# Patient Record
Sex: FEMALE | Race: BLACK OR AFRICAN AMERICAN | NOT HISPANIC OR LATINO
[De-identification: names, ages, dates, MRNs, and addresses within clinical notes are randomized per-mention and may not be internally consistent; named-entity substitution may affect disease eponyms.]

---

## 2018-06-27 ENCOUNTER — RESULT REVIEW (OUTPATIENT)
Age: 43
End: 2018-06-27

## 2021-06-16 ENCOUNTER — RESULT REVIEW (OUTPATIENT)
Age: 46
End: 2021-06-16

## 2021-10-28 ENCOUNTER — OUTPATIENT (OUTPATIENT)
Dept: OUTPATIENT SERVICES | Facility: HOSPITAL | Age: 46
LOS: 1 days | End: 2021-10-28
Payer: COMMERCIAL

## 2021-10-28 VITALS
HEART RATE: 95 BPM | DIASTOLIC BLOOD PRESSURE: 90 MMHG | WEIGHT: 158.07 LBS | SYSTOLIC BLOOD PRESSURE: 150 MMHG | RESPIRATION RATE: 16 BRPM | TEMPERATURE: 98 F | HEIGHT: 64 IN | OXYGEN SATURATION: 99 %

## 2021-10-28 DIAGNOSIS — N93.9 ABNORMAL UTERINE AND VAGINAL BLEEDING, UNSPECIFIED: ICD-10-CM

## 2021-10-28 DIAGNOSIS — Z01.818 ENCOUNTER FOR OTHER PREPROCEDURAL EXAMINATION: ICD-10-CM

## 2021-10-28 DIAGNOSIS — Z29.9 ENCOUNTER FOR PROPHYLACTIC MEASURES, UNSPECIFIED: ICD-10-CM

## 2021-10-28 DIAGNOSIS — I10 ESSENTIAL (PRIMARY) HYPERTENSION: ICD-10-CM

## 2021-10-28 LAB
A1C WITH ESTIMATED AVERAGE GLUCOSE RESULT: 5.7 % — HIGH (ref 4–5.6)
ALLERGY+IMMUNOLOGY DIAG STUDY NOTE: SIGNIFICANT CHANGE UP
ANION GAP SERPL CALC-SCNC: 12 MMOL/L — SIGNIFICANT CHANGE UP (ref 5–17)
ANTIBODY INTERPRETATION 2: SIGNIFICANT CHANGE UP
APPEARANCE UR: CLEAR — SIGNIFICANT CHANGE UP
APTT BLD: 31.8 SEC — SIGNIFICANT CHANGE UP (ref 27.5–35.5)
BACTERIA # UR AUTO: ABNORMAL
BASOPHILS # BLD AUTO: 0.02 K/UL — SIGNIFICANT CHANGE UP (ref 0–0.2)
BASOPHILS NFR BLD AUTO: 0.5 % — SIGNIFICANT CHANGE UP (ref 0–2)
BILIRUB UR-MCNC: NEGATIVE — SIGNIFICANT CHANGE UP
BLD GP AB SCN SERPL QL: SIGNIFICANT CHANGE UP
BUN SERPL-MCNC: 7.5 MG/DL — LOW (ref 8–20)
CALCIUM SERPL-MCNC: 9.5 MG/DL — SIGNIFICANT CHANGE UP (ref 8.6–10.2)
CHLORIDE SERPL-SCNC: 101 MMOL/L — SIGNIFICANT CHANGE UP (ref 98–107)
CO2 SERPL-SCNC: 25 MMOL/L — SIGNIFICANT CHANGE UP (ref 22–29)
COD CRY URNS QL: ABNORMAL
COLOR SPEC: YELLOW — SIGNIFICANT CHANGE UP
CREAT SERPL-MCNC: 0.51 MG/DL — SIGNIFICANT CHANGE UP (ref 0.5–1.3)
DAT IGG-SP REAG RBC-IMP: ABNORMAL
DIFF PNL FLD: ABNORMAL
DIR ANTIGLOB POLYSPECIFIC INTERPRETATION: ABNORMAL
EOSINOPHIL # BLD AUTO: 0.03 K/UL — SIGNIFICANT CHANGE UP (ref 0–0.5)
EOSINOPHIL NFR BLD AUTO: 0.7 % — SIGNIFICANT CHANGE UP (ref 0–6)
EPI CELLS # UR: SIGNIFICANT CHANGE UP
ESTIMATED AVERAGE GLUCOSE: 117 MG/DL — HIGH (ref 68–114)
GLUCOSE SERPL-MCNC: 122 MG/DL — HIGH (ref 70–99)
GLUCOSE UR QL: NEGATIVE MG/DL — SIGNIFICANT CHANGE UP
HCG UR QL: NEGATIVE — SIGNIFICANT CHANGE UP
HCT VFR BLD CALC: 43.1 % — SIGNIFICANT CHANGE UP (ref 34.5–45)
HGB BLD-MCNC: 13.5 G/DL — SIGNIFICANT CHANGE UP (ref 11.5–15.5)
IAT COMP-SP REAG SERPL QL: SIGNIFICANT CHANGE UP
IMM GRANULOCYTES NFR BLD AUTO: 0.2 % — SIGNIFICANT CHANGE UP (ref 0–1.5)
INR BLD: 1.06 RATIO — SIGNIFICANT CHANGE UP (ref 0.88–1.16)
KETONES UR-MCNC: ABNORMAL
LEUKOCYTE ESTERASE UR-ACNC: NEGATIVE — SIGNIFICANT CHANGE UP
LYMPHOCYTES # BLD AUTO: 1.03 K/UL — SIGNIFICANT CHANGE UP (ref 1–3.3)
LYMPHOCYTES # BLD AUTO: 23.7 % — SIGNIFICANT CHANGE UP (ref 13–44)
MCHC RBC-ENTMCNC: 24.2 PG — LOW (ref 27–34)
MCHC RBC-ENTMCNC: 31.3 GM/DL — LOW (ref 32–36)
MCV RBC AUTO: 77.2 FL — LOW (ref 80–100)
MONOCYTES # BLD AUTO: 0.42 K/UL — SIGNIFICANT CHANGE UP (ref 0–0.9)
MONOCYTES NFR BLD AUTO: 9.7 % — SIGNIFICANT CHANGE UP (ref 2–14)
NEUTROPHILS # BLD AUTO: 2.83 K/UL — SIGNIFICANT CHANGE UP (ref 1.8–7.4)
NEUTROPHILS NFR BLD AUTO: 65.2 % — SIGNIFICANT CHANGE UP (ref 43–77)
NITRITE UR-MCNC: NEGATIVE — SIGNIFICANT CHANGE UP
PH UR: 6 — SIGNIFICANT CHANGE UP (ref 5–8)
PLATELET # BLD AUTO: 337 K/UL — SIGNIFICANT CHANGE UP (ref 150–400)
POTASSIUM SERPL-MCNC: 3.4 MMOL/L — LOW (ref 3.5–5.3)
POTASSIUM SERPL-SCNC: 3.4 MMOL/L — LOW (ref 3.5–5.3)
PROT UR-MCNC: 30 MG/DL
PROTHROM AB SERPL-ACNC: 12.3 SEC — SIGNIFICANT CHANGE UP (ref 10.6–13.6)
RBC # BLD: 5.58 M/UL — HIGH (ref 3.8–5.2)
RBC # FLD: 15.1 % — HIGH (ref 10.3–14.5)
RBC CASTS # UR COMP ASSIST: SIGNIFICANT CHANGE UP /HPF (ref 0–4)
SODIUM SERPL-SCNC: 138 MMOL/L — SIGNIFICANT CHANGE UP (ref 135–145)
SP GR SPEC: 1.02 — SIGNIFICANT CHANGE UP (ref 1.01–1.02)
T3 SERPL-MCNC: 140 NG/DL — SIGNIFICANT CHANGE UP (ref 80–200)
T4 AB SER-ACNC: 9 UG/DL — SIGNIFICANT CHANGE UP (ref 4.5–12)
TSH SERPL-MCNC: 0.34 UIU/ML — SIGNIFICANT CHANGE UP (ref 0.27–4.2)
UROBILINOGEN FLD QL: NEGATIVE MG/DL — SIGNIFICANT CHANGE UP
WBC # BLD: 4.34 K/UL — SIGNIFICANT CHANGE UP (ref 3.8–10.5)
WBC # FLD AUTO: 4.34 K/UL — SIGNIFICANT CHANGE UP (ref 3.8–10.5)
WBC UR QL: SIGNIFICANT CHANGE UP

## 2021-10-28 PROCEDURE — 93005 ELECTROCARDIOGRAM TRACING: CPT

## 2021-10-28 PROCEDURE — G0463: CPT

## 2021-10-28 PROCEDURE — 93010 ELECTROCARDIOGRAM REPORT: CPT

## 2021-10-28 PROCEDURE — 86077 PHYS BLOOD BANK SERV XMATCH: CPT

## 2021-10-28 RX ORDER — METRONIDAZOLE 500 MG
500 TABLET ORAL ONCE
Refills: 0 | Status: DISCONTINUED | OUTPATIENT
Start: 2021-11-09 | End: 2021-11-10

## 2021-10-28 NOTE — H&P PST ADULT - ASSESSMENT
CAPRINI SCORE    AGE RELATED RISK FACTORS                                                             [x ] Age 41-60 years                                            (1 Point)  [ ] Age: 61-74 years                                           (2 Points)                 [ ] Age= 75 years                                                (3 Points)             DISEASE RELATED RISK FACTORS                                                       [ ] Edema in the lower extremities                 (1 Point)                     [ ] Varicose veins                                               (1 Point)                                 [x ] BMI > 25 Kg/m2                                            (1 Point)                                  [ ] Serious infection (ie PNA)                            (1 Point)                     [ ] Lung disease ( COPD, Emphysema)            (1 Point)                                                                          [ ] Acute myocardial infarction                         (1 Point)                  [ ] Congestive heart failure (in the previous month)  (1 Point)         [ ] Inflammatory bowel disease                            (1 Point)                  [ ] Central venous access, PICC or Port               (2 points)       (within the last month)                                                                [ ] Stroke (in the previous month)                        (5 Points)    [ ] Previous or present malignancy                       (2 points)                                                                                                                                                         HEMATOLOGY RELATED FACTORS                                                         [ ] Prior episodes of VTE                                     (3 Points)                     [ ] Positive family history for VTE                      (3 Points)                  [ ] Prothrombin 09468 A                                     (3 Points)                     [ ] Factor V Leiden                                                (3 Points)                        [ ] Lupus anticoagulants                                      (3 Points)                                                           [ ] Anticardiolipin antibodies                              (3 Points)                                                       [ ] High homocysteine in the blood                   (3 Points)                                             [ ] Other congenital or acquired thrombophilia      (3 Points)                                                [ ] Heparin induced thrombocytopenia                  (3 Points)                                        MOBILITY RELATED FACTORS  [ ] Bed rest                                                         (1 Point)  [ ] Plaster cast                                                    (2 points)  [ ] Bed bound for more than 72 hours           (2 Points)    GENDER SPECIFIC FACTORS  [ ] Pregnancy or had a baby within the last month   (1 Point)  [ ] Post-partum < 6 weeks                                   (1 Point)  [ ] Hormonal therapy  or oral contraception   (1 Point)  [x ] History of pregnancy complications              (1 point)  [ ] Unexplained or recurrent              (1 Point)    OTHER RISK FACTORS                                           (1 Point)  [ ] BMI >40, smoking, diabetes requiring insulin, chemotherapy  blood transfusions and length of surgery over 2 hours    SURGERY RELATED RISK FACTORS  [ ]  Section within the last month     (1 Point)  [ ] Minor surgery                                                  (1 Point)  [ ] Arthroscopic surgery                                       (2 Points)  [x ] Planned major surgery lasting more            (2 Points)      than 45 minutes     [ ] Elective hip or knee joint replacement       (5 points)       surgery                                                TRAUMA RELATED RISK FACTORS  [ ] Fracture of the hip, pelvis, or leg                       (5 Points)  [ ] Spinal cord injury resulting in paralysis             (5 points)       (in the previous month)    [ ] Paralysis  (less than 1 month)                             (5 Points)  [ ] Multiple Trauma within 1 month                        (5 Points)    Total Score [   5     ]    Caprini Score 0-2: Low Risk, NO VTE prophylaxis required for most patients, encourage ambulation  Caprini Score 3-6: Moderate Risk , pharmacologic VTE prophylaxis is indicated for most patients (in the absence of contraindications)  Caprini Score Greater than or =7: High risk, pharmocologic VTE prophylaxis indicated for most patients (in the absence of contraindications)              OPIOID RISK TOOL    ML EACH BOX THAT APPLIES AND ADD TOTALS AT THE END    FAMILY HISTORY OF SUBSTANCE ABUSE                 FEMALE         MALE                                                Alcohol                             [  ]1 pt          [  ]3pts                                               Illegal Durgs                     [  ]2 pts        [  ]3pts                                               Rx Drugs                           [  ]4 pts        [  ]4 pts    PERSONAL HISTORY OF SUBSTANCE ABUSE                                                                                          Alcohol                             [  ]3 pts       [  ]3 pts                                               Illegal Durgs                     [  ]4 pts        [  ]4 pts                                               Rx Drugs                           [  ]5 pts        [  ]5 pts    AGE BETWEEN 16-45 YEARS                                      [  ]1 pt         [  ]1 pt    HISTORY OF PREADOLESCENT   SEXUAL ABUSE                                                             [  ]3 pts        [  ]0pts    PSYCHOLOGICAL DISEASE                     ADD, OCD, Bipolar, Schizophrenia        [  ]2 pts         [  ]2 pts                      Depression                                               [  ]1 pt           [  ]1 pt           SCORING TOTAL   0                                 A score of 3 or lower indicated LOW risk for future opiod abuse  A score of 4 to 7 indicated moderate risk for future opiod abuse  A score of 8 or higher indicates a high risk for opiod abuse    46 year old female , HTN, anemia, fibroids, presents to PST today with menorrhagia s/p lupron injection 3 months ago LMP 2021. Patient reports night sweats and nausea that she attributes to lupron injections. She denies any abdominal pain, constipation, vomiting, diarhea or spotting. Patient is scheduled for laparoscopic total hysterectomy and bilateral salpingectomy, 2 cystoscopy with Dr Leal on 21. Patient educated on surgical scrub, COVID testing , preadmission instructions, medical clearance and day of procedure medications, verbalizes understanding. Pt instructed to stop vitamins/supplements/herbal medications/ASA/NSAIDS for one week prior to surgery and discuss with PMD.  CAPRINI SCORE    AGE RELATED RISK FACTORS                                                             [x ] Age 41-60 years                                            (1 Point)  [ ] Age: 61-74 years                                           (2 Points)                 [ ] Age= 75 years                                                (3 Points)             DISEASE RELATED RISK FACTORS                                                       [ ] Edema in the lower extremities                 (1 Point)                     [ ] Varicose veins                                               (1 Point)                                 [x ] BMI > 25 Kg/m2                                            (1 Point)                                  [ ] Serious infection (ie PNA)                            (1 Point)                     [ ] Lung disease ( COPD, Emphysema)            (1 Point)                                                                          [ ] Acute myocardial infarction                         (1 Point)                  [ ] Congestive heart failure (in the previous month)  (1 Point)         [ ] Inflammatory bowel disease                            (1 Point)                  [ ] Central venous access, PICC or Port               (2 points)       (within the last month)                                                                [ ] Stroke (in the previous month)                        (5 Points)    [ ] Previous or present malignancy                       (2 points)                                                                                                                                                         HEMATOLOGY RELATED FACTORS                                                         [ ] Prior episodes of VTE                                     (3 Points)                     [ ] Positive family history for VTE                      (3 Points)                  [ ] Prothrombin 84688 A                                     (3 Points)                     [ ] Factor V Leiden                                                (3 Points)                        [ ] Lupus anticoagulants                                      (3 Points)                                                           [ ] Anticardiolipin antibodies                              (3 Points)                                                       [ ] High homocysteine in the blood                   (3 Points)                                             [ ] Other congenital or acquired thrombophilia      (3 Points)                                                [ ] Heparin induced thrombocytopenia                  (3 Points)                                        MOBILITY RELATED FACTORS  [ ] Bed rest                                                         (1 Point)  [ ] Plaster cast                                                    (2 points)  [ ] Bed bound for more than 72 hours           (2 Points)    GENDER SPECIFIC FACTORS  [ ] Pregnancy or had a baby within the last month   (1 Point)  [ ] Post-partum < 6 weeks                                   (1 Point)  [ ] Hormonal therapy  or oral contraception   (1 Point)  [x ] History of pregnancy complications              (1 point)  [ ] Unexplained or recurrent              (1 Point)    OTHER RISK FACTORS                                           (1 Point)  [ ] BMI >40, smoking, diabetes requiring insulin, chemotherapy  blood transfusions and length of surgery over 2 hours    SURGERY RELATED RISK FACTORS  [ ]  Section within the last month     (1 Point)  [ ] Minor surgery                                                  (1 Point)  [ ] Arthroscopic surgery                                       (2 Points)  [x ] Planned major surgery lasting more            (2 Points)      than 45 minutes     [ ] Elective hip or knee joint replacement       (5 points)       surgery                                                TRAUMA RELATED RISK FACTORS  [ ] Fracture of the hip, pelvis, or leg                       (5 Points)  [ ] Spinal cord injury resulting in paralysis             (5 points)       (in the previous month)    [ ] Paralysis  (less than 1 month)                             (5 Points)  [ ] Multiple Trauma within 1 month                        (5 Points)    Total Score [   5     ]    Caprini Score 0-2: Low Risk, NO VTE prophylaxis required for most patients, encourage ambulation  Caprini Score 3-6: Moderate Risk , pharmacologic VTE prophylaxis is indicated for most patients (in the absence of contraindications)  Caprini Score Greater than or =7: High risk, pharmocologic VTE prophylaxis indicated for most patients (in the absence of contraindications)              OPIOID RISK TOOL    ML EACH BOX THAT APPLIES AND ADD TOTALS AT THE END    FAMILY HISTORY OF SUBSTANCE ABUSE                 FEMALE         MALE                                                Alcohol                             [  ]1 pt          [  ]3pts                                               Illegal Durgs                     [  ]2 pts        [  ]3pts                                               Rx Drugs                           [  ]4 pts        [  ]4 pts    PERSONAL HISTORY OF SUBSTANCE ABUSE                                                                                          Alcohol                             [  ]3 pts       [  ]3 pts                                               Illegal Durgs                     [  ]4 pts        [  ]4 pts                                               Rx Drugs                           [  ]5 pts        [  ]5 pts    AGE BETWEEN 16-45 YEARS                                      [  ]1 pt         [  ]1 pt    HISTORY OF PREADOLESCENT   SEXUAL ABUSE                                                             [  ]3 pts        [  ]0pts    PSYCHOLOGICAL DISEASE                     ADD, OCD, Bipolar, Schizophrenia        [  ]2 pts         [  ]2 pts                      Depression                                               [  ]1 pt           [  ]1 pt           SCORING TOTAL   0                                 A score of 3 or lower indicated LOW risk for future opiod abuse  A score of 4 to 7 indicated moderate risk for future opiod abuse  A score of 8 or higher indicates a high risk for opiod abuse    46 year old female , HTN, anemia, fibroids, enlarged thyroid on US has never taken medication, presents to PST today with menorrhagia s/p lupron injection 3 months ago LMP 2021. Patient reports night sweats and nausea that she attributes to lupron injections. She denies any abdominal pain, constipation, vomiting, diarhea or spotting. Patient is scheduled for laparoscopic total hysterectomy and bilateral salpingectomy, 2 cystoscopy with Dr Leal on 21. Patient educated on surgical scrub, COVID testing , preadmission instructions, medical clearance and day of procedure medications, verbalizes understanding. Pt instructed to stop vitamins/supplements/herbal medications/ASA/NSAIDS for one week prior to surgery and discuss with PMD. Abdomen soft non tender, fibroid palpable LLQ, Thyroid is enlarged on exam, thyroid panel pending, BP recheck pending, medical clearance pending

## 2021-10-28 NOTE — H&P PST ADULT - PROBLEM SELECTOR PLAN 1
Patient is scheduled for laparoscopic total hysterectomy bilateral salpingectomy, cystoscopy on 11/9/21. Medical clearance pending

## 2021-10-28 NOTE — H&P PST ADULT - NSICDXPASTMEDICALHX_GEN_ALL_CORE_FT
PAST MEDICAL HISTORY:  Abnormal uterine bleeding     Anemia     Hypertension     Uterine leiomyoma

## 2021-10-28 NOTE — H&P PST ADULT - HISTORY OF PRESENT ILLNESS
46 year old female , HTN, anemia, fibroids, presents to PST today with menorrhagia s/p lupron injection 3 months ago LMP 2021. Patient reports night sweats and nausea that she attributes to lupron injections. She denies any abdominal pain, constipation, vomiting, diarhea or spotting. Patient is scheduled for laparoscopic total hysterectomy and bilateral salpingectomy, 2 cystoscopy with Dr Leal on 21.  Medical clearance pending  46 year old female , HTN, anemia, fibroids, history of enlarged thyroid but reports she has never taken medication. She presents to PST today with menorrhagia s/p lupron injection 3 months ago LMP 2021. Patient reports night sweats and nausea that she attributes to lupron injections. She denies any abdominal pain, constipation, vomiting, diarhea or spotting. Patient is scheduled for laparoscopic total hysterectomy and bilateral salpingectomy, 2 cystoscopy with Dr Leal on 21.  Medical clearance pending

## 2021-10-28 NOTE — H&P PST ADULT - NSANTHOSAYNRD_GEN_A_CORE
No. LEXX screening performed.  STOP BANG Legend: 0-2 = LOW Risk; 3-4 = INTERMEDIATE Risk; 5-8 = HIGH Risk

## 2021-10-28 NOTE — H&P PST ADULT - GASTROINTESTINAL DETAILS
palpable fibroid left lower quadrant/soft/nontender/no distention/bowel sounds normal/no bruit/no guarding/no rigidity

## 2021-10-28 NOTE — H&P PST ADULT - PROBLEM SELECTOR PLAN 3
BP BP elevated today at /90 pt reports she is nervous regarding procedure, takes amlodipine 10mg at night, continue as ordered, BP recheck at medical evaluation. Medical clearance pending

## 2021-10-28 NOTE — H&P PST ADULT - NEGATIVE GASTROINTESTINAL SYMPTOMS
no vomiting/no diarrhea/no constipation/no change in bowel habits/no flatulence/no abdominal pain/no melena

## 2021-10-29 LAB — ALLERGY+IMMUNOLOGY DIAG STUDY NOTE: SIGNIFICANT CHANGE UP

## 2021-11-03 DIAGNOSIS — Z01.818 ENCOUNTER FOR OTHER PREPROCEDURAL EXAMINATION: ICD-10-CM

## 2021-11-06 ENCOUNTER — APPOINTMENT (OUTPATIENT)
Dept: DISASTER EMERGENCY | Facility: CLINIC | Age: 46
End: 2021-11-06

## 2021-11-07 LAB — SARS-COV-2 N GENE NPH QL NAA+PROBE: NOT DETECTED

## 2021-11-08 ENCOUNTER — TRANSCRIPTION ENCOUNTER (OUTPATIENT)
Age: 46
End: 2021-11-08

## 2021-11-09 ENCOUNTER — INPATIENT (INPATIENT)
Facility: HOSPITAL | Age: 46
LOS: 0 days | Discharge: HOME | DRG: 743 | End: 2021-11-10
Attending: OBSTETRICS & GYNECOLOGY | Admitting: OBSTETRICS & GYNECOLOGY
Payer: COMMERCIAL

## 2021-11-09 ENCOUNTER — RESULT REVIEW (OUTPATIENT)
Age: 46
End: 2021-11-09

## 2021-11-09 VITALS
OXYGEN SATURATION: 100 % | HEIGHT: 64 IN | TEMPERATURE: 98 F | SYSTOLIC BLOOD PRESSURE: 169 MMHG | DIASTOLIC BLOOD PRESSURE: 103 MMHG | HEART RATE: 94 BPM | WEIGHT: 158.07 LBS | RESPIRATION RATE: 16 BRPM

## 2021-11-09 DIAGNOSIS — N93.9 ABNORMAL UTERINE AND VAGINAL BLEEDING, UNSPECIFIED: ICD-10-CM

## 2021-11-09 DIAGNOSIS — D25.9 LEIOMYOMA OF UTERUS, UNSPECIFIED: ICD-10-CM

## 2021-11-09 LAB
ANION GAP SERPL CALC-SCNC: 16 MMOL/L — SIGNIFICANT CHANGE UP (ref 5–17)
BLD GP AB SCN SERPL QL: SIGNIFICANT CHANGE UP
BUN SERPL-MCNC: 6.7 MG/DL — LOW (ref 8–20)
CALCIUM SERPL-MCNC: 8.8 MG/DL — SIGNIFICANT CHANGE UP (ref 8.6–10.2)
CHLORIDE SERPL-SCNC: 97 MMOL/L — LOW (ref 98–107)
CO2 SERPL-SCNC: 24 MMOL/L — SIGNIFICANT CHANGE UP (ref 22–29)
CREAT SERPL-MCNC: 0.45 MG/DL — LOW (ref 0.5–1.3)
GLUCOSE BLDC GLUCOMTR-MCNC: 111 MG/DL — HIGH (ref 70–99)
GLUCOSE BLDC GLUCOMTR-MCNC: 114 MG/DL — HIGH (ref 70–99)
GLUCOSE BLDC GLUCOMTR-MCNC: 168 MG/DL — HIGH (ref 70–99)
GLUCOSE SERPL-MCNC: 161 MG/DL — HIGH (ref 70–99)
POTASSIUM SERPL-MCNC: 3.6 MMOL/L — SIGNIFICANT CHANGE UP (ref 3.5–5.3)
POTASSIUM SERPL-SCNC: 3.6 MMOL/L — SIGNIFICANT CHANGE UP (ref 3.5–5.3)
SODIUM SERPL-SCNC: 137 MMOL/L — SIGNIFICANT CHANGE UP (ref 135–145)

## 2021-11-09 PROCEDURE — 88307 TISSUE EXAM BY PATHOLOGIST: CPT | Mod: 26

## 2021-11-09 PROCEDURE — 88302 TISSUE EXAM BY PATHOLOGIST: CPT | Mod: 26

## 2021-11-09 RX ORDER — FENTANYL CITRATE 50 UG/ML
50 INJECTION INTRAVENOUS
Refills: 0 | Status: DISCONTINUED | OUTPATIENT
Start: 2021-11-09 | End: 2021-11-09

## 2021-11-09 RX ORDER — ENOXAPARIN SODIUM 100 MG/ML
40 INJECTION SUBCUTANEOUS EVERY 24 HOURS
Refills: 0 | Status: DISCONTINUED | OUTPATIENT
Start: 2021-11-09 | End: 2021-11-10

## 2021-11-09 RX ORDER — OXYCODONE HYDROCHLORIDE 5 MG/1
10 TABLET ORAL
Refills: 0 | Status: DISCONTINUED | OUTPATIENT
Start: 2021-11-09 | End: 2021-11-10

## 2021-11-09 RX ORDER — IBUPROFEN 200 MG
600 TABLET ORAL EVERY 6 HOURS
Refills: 0 | Status: DISCONTINUED | OUTPATIENT
Start: 2021-11-09 | End: 2021-11-10

## 2021-11-09 RX ORDER — CEFAZOLIN SODIUM 1 G
2000 VIAL (EA) INJECTION ONCE
Refills: 0 | Status: COMPLETED | OUTPATIENT
Start: 2021-11-09 | End: 2021-11-09

## 2021-11-09 RX ORDER — ACETAMINOPHEN 500 MG
975 TABLET ORAL EVERY 6 HOURS
Refills: 0 | Status: DISCONTINUED | OUTPATIENT
Start: 2021-11-09 | End: 2021-11-10

## 2021-11-09 RX ORDER — ACETAMINOPHEN 500 MG
975 TABLET ORAL ONCE
Refills: 0 | Status: COMPLETED | OUTPATIENT
Start: 2021-11-09 | End: 2021-11-09

## 2021-11-09 RX ORDER — AMLODIPINE BESYLATE 2.5 MG/1
10 TABLET ORAL DAILY
Refills: 0 | Status: DISCONTINUED | OUTPATIENT
Start: 2021-11-09 | End: 2021-11-10

## 2021-11-09 RX ORDER — SENNA PLUS 8.6 MG/1
2 TABLET ORAL AT BEDTIME
Refills: 0 | Status: DISCONTINUED | OUTPATIENT
Start: 2021-11-09 | End: 2021-11-10

## 2021-11-09 RX ORDER — SODIUM CHLORIDE 9 MG/ML
1000 INJECTION, SOLUTION INTRAVENOUS
Refills: 0 | Status: DISCONTINUED | OUTPATIENT
Start: 2021-11-09 | End: 2021-11-09

## 2021-11-09 RX ORDER — ONDANSETRON 8 MG/1
4 TABLET, FILM COATED ORAL ONCE
Refills: 0 | Status: COMPLETED | OUTPATIENT
Start: 2021-11-09 | End: 2021-11-09

## 2021-11-09 RX ORDER — SODIUM CHLORIDE 9 MG/ML
1000 INJECTION, SOLUTION INTRAVENOUS
Refills: 0 | Status: DISCONTINUED | OUTPATIENT
Start: 2021-11-09 | End: 2021-11-10

## 2021-11-09 RX ORDER — KETOROLAC TROMETHAMINE 30 MG/ML
30 SYRINGE (ML) INJECTION EVERY 6 HOURS
Refills: 0 | Status: DISCONTINUED | OUTPATIENT
Start: 2021-11-09 | End: 2021-11-10

## 2021-11-09 RX ORDER — AMLODIPINE BESYLATE 2.5 MG/1
1 TABLET ORAL
Qty: 0 | Refills: 0 | DISCHARGE

## 2021-11-09 RX ORDER — OXYCODONE HYDROCHLORIDE 5 MG/1
5 TABLET ORAL
Refills: 0 | Status: DISCONTINUED | OUTPATIENT
Start: 2021-11-09 | End: 2021-11-10

## 2021-11-09 RX ORDER — ONDANSETRON 8 MG/1
4 TABLET, FILM COATED ORAL EVERY 6 HOURS
Refills: 0 | Status: DISCONTINUED | OUTPATIENT
Start: 2021-11-09 | End: 2021-11-10

## 2021-11-09 RX ORDER — SODIUM CHLORIDE 9 MG/ML
3 INJECTION INTRAMUSCULAR; INTRAVENOUS; SUBCUTANEOUS ONCE
Refills: 0 | Status: DISCONTINUED | OUTPATIENT
Start: 2021-11-09 | End: 2021-11-09

## 2021-11-09 RX ORDER — METOCLOPRAMIDE HCL 10 MG
10 TABLET ORAL EVERY 6 HOURS
Refills: 0 | Status: DISCONTINUED | OUTPATIENT
Start: 2021-11-09 | End: 2021-11-10

## 2021-11-09 RX ADMIN — FENTANYL CITRATE 50 MICROGRAM(S): 50 INJECTION INTRAVENOUS at 17:22

## 2021-11-09 RX ADMIN — Medication 100 MILLIGRAM(S): at 11:20

## 2021-11-09 RX ADMIN — FENTANYL CITRATE 50 MICROGRAM(S): 50 INJECTION INTRAVENOUS at 17:35

## 2021-11-09 RX ADMIN — Medication 975 MILLIGRAM(S): at 22:04

## 2021-11-09 RX ADMIN — Medication 975 MILLIGRAM(S): at 09:53

## 2021-11-09 RX ADMIN — AMLODIPINE BESYLATE 10 MILLIGRAM(S): 2.5 TABLET ORAL at 20:58

## 2021-11-09 RX ADMIN — Medication 975 MILLIGRAM(S): at 21:23

## 2021-11-09 RX ADMIN — FENTANYL CITRATE 50 MICROGRAM(S): 50 INJECTION INTRAVENOUS at 18:10

## 2021-11-09 RX ADMIN — FENTANYL CITRATE 50 MICROGRAM(S): 50 INJECTION INTRAVENOUS at 17:55

## 2021-11-09 RX ADMIN — SODIUM CHLORIDE 75 MILLILITER(S): 9 INJECTION, SOLUTION INTRAVENOUS at 17:30

## 2021-11-09 RX ADMIN — ONDANSETRON 4 MILLIGRAM(S): 8 TABLET, FILM COATED ORAL at 17:16

## 2021-11-09 NOTE — BRIEF OPERATIVE NOTE - OPERATION/FINDINGS
24 week size fibroid uterus, normal appearing fallopian tubes and ovaries, bilateral UO jets identified

## 2021-11-09 NOTE — BRIEF OPERATIVE NOTE - NSICDXBRIEFPROCEDURE_GEN_ALL_CORE_FT
PROCEDURES:  Laparoscopic total hysterectomy with cystoscopy 09-Nov-2021 16:18:08  Angelica Perez  Bilateral salpingectomy 09-Nov-2021 16:18:22  Angelica Perez

## 2021-11-09 NOTE — CHART NOTE - NSCHARTNOTEFT_GEN_A_CORE
JESÚS BROOKE  [id number]  46y0 F with symptomatic fibroid uterus, status post laparoscopic total hysterectomy, bilateral salpingectomy, cystoscopy.  Postoperative check    Subjective:     Pt seen and examined at bedside. Pain controlled. Patient feels the urge and will ambulate with help to the bathroom. Passing flatus. Tolerating clear liquid diet. Pt denies fever, chills, chest pain, SOB, nausea, vomiting, lightheadedness, dizziness.      Objective:  T(F): 98.8 (11-09-21 @ 19:28), Max: 98.8 (11-09-21 @ 18:43)  HR: 87 (11-09-21 @ 19:28) (68 - 99)  BP: 140/89 (11-09-21 @ 19:28) (140/89 - 169/103)  RR: 16 (11-09-21 @ 19:28) (12 - 16)  SpO2: 98% (11-09-21 @ 19:28) (98% - 100%)    CAPILLARY BLOOD GLUCOSE      POCT Blood Glucose.: 168 mg/dL (09 Nov 2021 16:42)  POCT Blood Glucose.: 111 mg/dL (09 Nov 2021 11:18)  POCT Blood Glucose.: 114 mg/dL (09 Nov 2021 09:39)      MEDICATIONS  (STANDING):  acetaminophen     Tablet .. 975 milliGRAM(s) Oral every 6 hours  amLODIPine   Tablet 10 milliGRAM(s) Oral daily  enoxaparin Injectable 40 milliGRAM(s) SubCutaneous every 24 hours  ibuprofen  Tablet. 600 milliGRAM(s) Oral every 6 hours  ketorolac   Injectable 30 milliGRAM(s) IV Push every 6 hours  lactated ringers. 1000 milliLiter(s) (75 mL/Hr) IV Continuous <Continuous>  metroNIDAZOLE  IVPB 500 milliGRAM(s) IV Intermittent Once    MEDICATIONS  (PRN):  metoclopramide Injectable 10 milliGRAM(s) IV Push every 6 hours PRN Nausea and/or Vomiting  ondansetron Injectable 4 milliGRAM(s) IV Push every 6 hours PRN Nausea and/or Vomiting  oxyCODONE    IR 5 milliGRAM(s) Oral every 3 hours PRN Moderate Pain (4 - 6)  oxyCODONE    IR 10 milliGRAM(s) Oral every 3 hours PRN Severe Pain (7 - 10)  senna 2 Tablet(s) Oral at bedtime PRN Constipation      Physical Exam:  Constitutional: NAD, A+O x3  CV: RRR  Lungs: clear to auscultation bilaterally  Abdomen: soft,[+] bowel sounds, appropriately tender, softly distended, no rebound or guarding  Incisions: covered with upsites, clean, dry, intact  Extremities: no lower extremity edema or calf tenderness bilaterally, venodynes in place        AM labs ordered    JESÚS BROOKE  [id number]  46y0 F with symptomatic fibroid uterus, status post laparoscopic total hysterectomy, bilateral salpingectomy, cystoscopy.  Postoperative check  Neuro: Continue oral meds for pain control  CV: Hemodynamically stable  Pulm: Saturating well on RA. Increase incentive spirometry, out of bed, and ambulation as tolerated.  GI: advance diet as tolerated  : pending to void  Heme: Continue Lovenox and Venodynes for DVT ppx. Increase OOB and ambulation.

## 2021-11-10 ENCOUNTER — TRANSCRIPTION ENCOUNTER (OUTPATIENT)
Age: 46
End: 2021-11-10

## 2021-11-10 VITALS
SYSTOLIC BLOOD PRESSURE: 105 MMHG | HEART RATE: 66 BPM | DIASTOLIC BLOOD PRESSURE: 83 MMHG | RESPIRATION RATE: 18 BRPM | OXYGEN SATURATION: 100 % | TEMPERATURE: 99 F

## 2021-11-10 LAB
ANION GAP SERPL CALC-SCNC: 10 MMOL/L — SIGNIFICANT CHANGE UP (ref 5–17)
BASOPHILS # BLD AUTO: 0.01 K/UL — SIGNIFICANT CHANGE UP (ref 0–0.2)
BASOPHILS NFR BLD AUTO: 0.1 % — SIGNIFICANT CHANGE UP (ref 0–2)
BUN SERPL-MCNC: 4.4 MG/DL — LOW (ref 8–20)
CALCIUM SERPL-MCNC: 9 MG/DL — SIGNIFICANT CHANGE UP (ref 8.6–10.2)
CHLORIDE SERPL-SCNC: 101 MMOL/L — SIGNIFICANT CHANGE UP (ref 98–107)
CO2 SERPL-SCNC: 27 MMOL/L — SIGNIFICANT CHANGE UP (ref 22–29)
COVID-19 NUCLEOCAPSID GAM AB INTERP: NEGATIVE — SIGNIFICANT CHANGE UP
COVID-19 NUCLEOCAPSID TOTAL GAM ANTIBODY RESULT: 0.09 INDEX — SIGNIFICANT CHANGE UP
COVID-19 SPIKE DOMAIN AB INTERP: POSITIVE
COVID-19 SPIKE DOMAIN ANTIBODY RESULT: >250 U/ML — HIGH
CREAT SERPL-MCNC: 0.43 MG/DL — LOW (ref 0.5–1.3)
EOSINOPHIL # BLD AUTO: 0 K/UL — SIGNIFICANT CHANGE UP (ref 0–0.5)
EOSINOPHIL NFR BLD AUTO: 0 % — SIGNIFICANT CHANGE UP (ref 0–6)
GLUCOSE SERPL-MCNC: 112 MG/DL — HIGH (ref 70–99)
HCT VFR BLD CALC: 29.6 % — LOW (ref 34.5–45)
HGB BLD-MCNC: 9.5 G/DL — LOW (ref 11.5–15.5)
IMM GRANULOCYTES NFR BLD AUTO: 0.4 % — SIGNIFICANT CHANGE UP (ref 0–1.5)
LYMPHOCYTES # BLD AUTO: 1.1 K/UL — SIGNIFICANT CHANGE UP (ref 1–3.3)
LYMPHOCYTES # BLD AUTO: 14.4 % — SIGNIFICANT CHANGE UP (ref 13–44)
MCHC RBC-ENTMCNC: 24.4 PG — LOW (ref 27–34)
MCHC RBC-ENTMCNC: 32.1 GM/DL — SIGNIFICANT CHANGE UP (ref 32–36)
MCV RBC AUTO: 76.1 FL — LOW (ref 80–100)
MONOCYTES # BLD AUTO: 0.94 K/UL — HIGH (ref 0–0.9)
MONOCYTES NFR BLD AUTO: 12.3 % — SIGNIFICANT CHANGE UP (ref 2–14)
NEUTROPHILS # BLD AUTO: 5.57 K/UL — SIGNIFICANT CHANGE UP (ref 1.8–7.4)
NEUTROPHILS NFR BLD AUTO: 72.8 % — SIGNIFICANT CHANGE UP (ref 43–77)
PLATELET # BLD AUTO: 273 K/UL — SIGNIFICANT CHANGE UP (ref 150–400)
POTASSIUM SERPL-MCNC: 3.4 MMOL/L — LOW (ref 3.5–5.3)
POTASSIUM SERPL-SCNC: 3.4 MMOL/L — LOW (ref 3.5–5.3)
RBC # BLD: 3.89 M/UL — SIGNIFICANT CHANGE UP (ref 3.8–5.2)
RBC # FLD: 14.5 % — SIGNIFICANT CHANGE UP (ref 10.3–14.5)
SARS-COV-2 IGG+IGM SERPL QL IA: 0.09 INDEX — SIGNIFICANT CHANGE UP
SARS-COV-2 IGG+IGM SERPL QL IA: >250 U/ML — HIGH
SARS-COV-2 IGG+IGM SERPL QL IA: NEGATIVE — SIGNIFICANT CHANGE UP
SARS-COV-2 IGG+IGM SERPL QL IA: POSITIVE
SODIUM SERPL-SCNC: 138 MMOL/L — SIGNIFICANT CHANGE UP (ref 135–145)
WBC # BLD: 7.65 K/UL — SIGNIFICANT CHANGE UP (ref 3.8–10.5)
WBC # FLD AUTO: 7.65 K/UL — SIGNIFICANT CHANGE UP (ref 3.8–10.5)

## 2021-11-10 PROCEDURE — 36415 COLL VENOUS BLD VENIPUNCTURE: CPT

## 2021-11-10 PROCEDURE — 86769 SARS-COV-2 COVID-19 ANTIBODY: CPT

## 2021-11-10 PROCEDURE — 86900 BLOOD TYPING SEROLOGIC ABO: CPT

## 2021-11-10 PROCEDURE — 88302 TISSUE EXAM BY PATHOLOGIST: CPT

## 2021-11-10 PROCEDURE — C1889: CPT

## 2021-11-10 PROCEDURE — 86922 COMPATIBILITY TEST ANTIGLOB: CPT

## 2021-11-10 PROCEDURE — 86901 BLOOD TYPING SEROLOGIC RH(D): CPT

## 2021-11-10 PROCEDURE — 82962 GLUCOSE BLOOD TEST: CPT

## 2021-11-10 PROCEDURE — 86850 RBC ANTIBODY SCREEN: CPT

## 2021-11-10 PROCEDURE — 85025 COMPLETE CBC W/AUTO DIFF WBC: CPT

## 2021-11-10 PROCEDURE — 88307 TISSUE EXAM BY PATHOLOGIST: CPT

## 2021-11-10 PROCEDURE — 80048 BASIC METABOLIC PNL TOTAL CA: CPT

## 2021-11-10 RX ORDER — ERGOCALCIFEROL 1.25 MG/1
0 CAPSULE ORAL
Qty: 0 | Refills: 0 | DISCHARGE

## 2021-11-10 RX ORDER — GARLIC 1000 MG
0 CAPSULE ORAL
Qty: 0 | Refills: 0 | DISCHARGE

## 2021-11-10 RX ORDER — ASCORBIC ACID 60 MG
1 TABLET,CHEWABLE ORAL
Qty: 0 | Refills: 0 | DISCHARGE

## 2021-11-10 RX ORDER — FERROUS SULFATE 325(65) MG
1 TABLET ORAL
Qty: 30 | Refills: 0
Start: 2021-11-10 | End: 2021-12-09

## 2021-11-10 RX ORDER — FERROUS SULFATE 325(65) MG
0 TABLET ORAL
Qty: 0 | Refills: 0 | DISCHARGE

## 2021-11-10 RX ORDER — POTASSIUM CHLORIDE 20 MEQ
20 PACKET (EA) ORAL ONCE
Refills: 0 | Status: COMPLETED | OUTPATIENT
Start: 2021-11-10 | End: 2021-11-10

## 2021-11-10 RX ADMIN — Medication 975 MILLIGRAM(S): at 16:06

## 2021-11-10 RX ADMIN — Medication 975 MILLIGRAM(S): at 04:00

## 2021-11-10 RX ADMIN — Medication 30 MILLIGRAM(S): at 00:26

## 2021-11-10 RX ADMIN — Medication 600 MILLIGRAM(S): at 12:25

## 2021-11-10 RX ADMIN — Medication 600 MILLIGRAM(S): at 13:00

## 2021-11-10 RX ADMIN — Medication 975 MILLIGRAM(S): at 03:08

## 2021-11-10 RX ADMIN — Medication 30 MILLIGRAM(S): at 05:52

## 2021-11-10 RX ADMIN — Medication 30 MILLIGRAM(S): at 00:41

## 2021-11-10 RX ADMIN — Medication 975 MILLIGRAM(S): at 10:49

## 2021-11-10 RX ADMIN — Medication 20 MILLIEQUIVALENT(S): at 09:23

## 2021-11-10 RX ADMIN — ENOXAPARIN SODIUM 40 MILLIGRAM(S): 100 INJECTION SUBCUTANEOUS at 00:32

## 2021-11-10 RX ADMIN — Medication 30 MILLIGRAM(S): at 06:07

## 2021-11-10 RX ADMIN — Medication 975 MILLIGRAM(S): at 09:52

## 2021-11-10 NOTE — PROGRESS NOTE ADULT - SUBJECTIVE AND OBJECTIVE BOX
JESÚS BROOKE is a 46y now POD#1 s/p TLH BS    S:    No acute events overnight.   Patient was seen and examined at bedside.   Patient has no complaints this AM.   Pain is well controlled with current treatment regimen.   Tolerating regular diet, denies N/V.   Ambulating without difficulty.   + flatus/-BM/+ voiding  She denies lightheadedness, dizziness, palpitations, chest pain and SOB.     O:   T(C): 36.6 (11-10-21 @ 04:51), Max: 37.1 (11-09-21 @ 18:43)  HR: 80 (11-10-21 @ 04:51) (68 - 99)  BP: 134/85 (11-10-21 @ 04:51) (134/85 - 169/103)  RR: 16 (11-10-21 @ 04:51) (12 - 18)  SpO2: 97% (11-10-21 @ 04:51) (97% - 100%)    Gen: NAD, AAOx3  CV: RRR, S1/S2 present  Pulm: CTAB  Abdomen: Soft, nondistended, appropriately tender, + BS   Pelvic: Pad inspected with minimal amount of dark red blood   Incision: Clean, dry and intact  Extremities: No calf tenderness or edema     Labs:       11-09-21 @ 07:01  -  11-10-21 @ 06:51  --------------------------------------------------------  IN: 150 mL / OUT: 1200 mL / NET: -1050 mL             JESÚS BROOKE is a 46y now POD#1 s/p TL BS for symptomatic fibroid uterus.    S:    No acute events overnight.   Patient was seen and examined at bedside.   Patient has no complaints this AM.   Pain is well controlled with current treatment regimen.   Tolerating regular diet, denies N/V.   Ambulating without difficulty.   + flatus/-BM/+ voiding  She denies lightheadedness, dizziness, palpitations, chest pain and SOB.     O:   T(C): 36.6 (11-10-21 @ 04:51), Max: 37.1 (11-09-21 @ 18:43)  HR: 80 (11-10-21 @ 04:51) (68 - 99)  BP: 134/85 (11-10-21 @ 04:51) (134/85 - 169/103)  RR: 16 (11-10-21 @ 04:51) (12 - 18)  SpO2: 97% (11-10-21 @ 04:51) (97% - 100%)    Gen: NAD, AAOx3  CV: RRR, S1/S2 present  Pulm: CTAB  Abdomen: Soft, nondistended, appropriately tender, + BS   Pelvic: Pad inspected with minimal amount of dark red blood   Laparoscopic Incisions: Clean, dry and intact  Extremities: No calf tenderness or edema     Labs:                        9.5    7.65  )-----------( 273      ( 10 Nov 2021 06:38 )             29.6     11-10    138  |  101  |  4.4<L>  ----------------------------<  112<H>  3.4<L>   |  27.0  |  0.43<L>    Ca    9.0      10 Nov 2021 06:38        11-09-21 @ 07:01  -  11-10-21 @ 06:51  --------------------------------------------------------  IN: 150 mL / OUT: 1200 mL / NET: -1050 mL

## 2021-11-10 NOTE — DISCHARGE NOTE PROVIDER - HOSPITAL COURSE
Patient had uncomplicated intraoperative course, she was subsequently transferred to floor where she had an uncomplicated recovery. On POD#1, she had post operative labs WNL, pain was well controlled with PRN medications, she was ambulating, she was tolerating regular diet, and she was voiding spontaneously.

## 2021-11-10 NOTE — DISCHARGE NOTE PROVIDER - NSDCCPCAREPLAN_GEN_ALL_CORE_FT
PRINCIPAL DISCHARGE DIAGNOSIS  Diagnosis: S/P total hysterectomy  Assessment and Plan of Treatment:       SECONDARY DISCHARGE DIAGNOSES  Diagnosis: Status post bilateral salpingectomy  Assessment and Plan of Treatment:

## 2021-11-10 NOTE — PROGRESS NOTE ADULT - ASSESSMENT
A/P: 47 y/o s/p TLH BS for symptomatic fibroids  Neuro: Pain well controlled. Can discharge on po acetaminophen, ibuprofen, and 10 oxycodone.  CV: History of HTN. On amlodipine 10mg. Blood pressure well controlled.  Pulm: No active disease. Saturating well on room air. Incentive spirometer use encouraged  GI: No active disease. Bowel sounds and function normal, tolerating PO diet. Continue current bowel regimen.   : Mayer removed, voiding spontaneously.  Heme: Hgb _ -> AM labs pending  ID: Afebrile. No antibiotics indicated at this time.   Endo: No active disease.   FEN: IVF at _. Will discontinue IVF when tolerating PO. Electrolytes WNL. AM labs pending.   Skin: No active disease.   Psych: No active disease.   DVT ppx: Ambulation encouraged, SCDs when in bed, lovenox ordered.  Dispo: Discharge today.   JESÚS BROOKE is a 46y now POD#1 s/p TLH BS for symptomatic fibroid uterus.    Neuro: Pain well controlled. Can discharge on po acetaminophen, ibuprofen, and several oxycodone pills.  CV: History of HTN. On amlodipine 10mg. Blood pressure well controlled.  Pulm: No active disease. Saturating well on room air. Incentive spirometer use encouraged patient reaches 500-700  GI: No active disease. Bowel sounds and function normal, tolerating PO diet. Continue current bowel regimen.   : voiding spontaneously.  Heme: Hgb 13.5 -> 9.5 patient asymptomatic  ID: Afebrile. No antibiotics indicated at this time.   Endo: No active disease.   FEN: No IV fluids due to adequate po intake. Will replete potassium.  Skin: No active disease.   Psych: No active disease.   DVT ppx: Ambulation encouraged, SCDs when in bed, lovenox ordered.  Dispo: Consder discharge today.    PGY 4 Note: Patient seen and evaluated at bedside, note reviewed and edited as needed. Agree with the above.   JESÚS BROOKE is a 46y now POD#1 s/p TLH BS for symptomatic fibroid uterus.    Neuro: Pain well controlled. Can discharge on po acetaminophen, ibuprofen, and several oxycodone pills.  CV: History of HTN. On amlodipine 10mg. Blood pressure well controlled.  Pulm: No active disease. Saturating well on room air. Incentive spirometer use encouraged patient reaches 500-700  GI: No active disease. Bowel sounds and function normal, tolerating PO diet. Continue current bowel regimen.   : voiding spontaneously.  Heme: Hgb 13.5 -> 9.5 patient asymptomatic  ID: Afebrile. No antibiotics indicated at this time.   Endo: No active disease.   FEN: No IV fluids due to adequate po intake. Will replete potassium.  Skin: No active disease.   Psych: No active disease.   DVT ppx: Ambulation encouraged, SCDs when in bed, lovenox ordered.  Dispo: Consder discharge today.    PGY 4 Note: Patient seen and evaluated at bedside, note reviewed and edited as needed. Agree with the above.      Patient was checked and seen at bedside. Patient can be discharged home today and will follow-up in clinic for post-OP check.    Dr. Leal

## 2021-11-10 NOTE — DISCHARGE NOTE PROVIDER - CARE PROVIDER_API CALL
Jarrett Leal)  Obstetrics and Gynecology  [facility] [place] [address]  Phone: [phone]  Fax: [phone]  Follow Up Time: 2 weeks

## 2021-11-10 NOTE — DISCHARGE NOTE NURSING/CASE MANAGEMENT/SOCIAL WORK - PATIENT PORTAL LINK FT
You can access the FollowMyHealth Patient Portal offered by [facility] by registering at the following website: [email or web address]. By joining [employer]’s FollowMyHealth portal, you will also be able to view your health information using other applications (apps) compatible with our system.

## 2021-11-10 NOTE — DISCHARGE NOTE PROVIDER - NSDCMRMEDTOKEN_GEN_ALL_CORE_FT
amLODIPine 10 mg oral tablet: 1 tab(s) orally once a day   amLODIPine 10 mg oral tablet: 1 tab(s) orally once a day  ferrous sulfate 325 mg (65 mg elemental iron) oral tablet: 1 tab(s) orally once a day

## 2021-11-12 NOTE — CHART NOTE - NSCHARTNOTEFT_GEN_A_CORE
Antibody Interpretation: Anti-Mercedes; Anti-Leb    Patient is a 46 year old female , HTN, anemia, fibroids, history of enlarged thyroid but reports she has never taken medication. She presents to [facility] today with menorrhagia s/p lupron injection 3 months ago LMP 2021. Patient reports night sweats and nausea that she attributes to lupron injections. She denies any abdominal pain, constipation, vomiting, diarhea or spotting. Patient is scheduled for laparoscopic total hysterectomy and bilateral salpingectomy, 2 cystoscopy with Dr Leal on 21.  Blood sample received on 10/28/21 shows the patient is blood group A Rh(D) positive. 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 Anti-Mercedes usually is an IgM antibody directed against the Mercedes antigen in the Tim blood group system. Anti-Mercedes is generally not considered clinically significant and antigen negative blood is not necessary. Units provided are crossmatch compatible through the AHG phase of testing. For an antibody to cause HDFN it must be able to cross the placenta. The antibody must also react with antigens on the red blood cells. Because most Tim antibodies are IgM and do not cross the placenta, and because Tim antigens are not present on fetal and  erythrocytes, Tim antibodies have rarely been implicated in HDFN. Pregnancy can cause transient Tim antibodies to form. Tim antibodies are usually IgM and will not cross the placenta. Additionally, anti-Leb is identified in patient's plasma. Anti-Leb is an IgM antibody directed against the Leb antigen in the Tim blood group system. Anti-Leb is not considered clinically significant and antigen negative blood is not necessary. Units provided are crossmatch compatible through the AHG phase of testing. Pregnancy can cause transient Tim antibodies to form. Tim antibodies are usually IgM and will not cross the placenta.

## 2021-11-19 LAB — SURGICAL PATHOLOGY STUDY: SIGNIFICANT CHANGE UP

## 2023-04-10 NOTE — ASU PATIENT PROFILE, ADULT - NS PRO INFO GIVEN TO
Called in stating that he needed Metoprolol tartrate 25 mg twice daily 90 day supply , pansoprazole 40 mg once a day 90 day supply  and ferrous sulfate 300 mg tab once daily 90 day supply all sent to [facility]. They were gave to him when he was in the hospital but wasn't called in the last time he was in the office. Please advise  
patient

## 2024-01-07 ENCOUNTER — EMERGENCY (EMERGENCY)
Facility: HOSPITAL | Age: 49
LOS: 1 days | Discharge: HOME | End: 2024-01-07
Attending: EMERGENCY MEDICINE
Payer: COMMERCIAL

## 2024-01-07 VITALS
SYSTOLIC BLOOD PRESSURE: 151 MMHG | OXYGEN SATURATION: 99 % | RESPIRATION RATE: 16 BRPM | HEART RATE: 66 BPM | TEMPERATURE: 98 F | DIASTOLIC BLOOD PRESSURE: 98 MMHG

## 2024-01-07 VITALS
HEART RATE: 89 BPM | OXYGEN SATURATION: 100 % | RESPIRATION RATE: 22 BRPM | WEIGHT: 149.91 LBS | HEIGHT: 63 IN | SYSTOLIC BLOOD PRESSURE: 165 MMHG | TEMPERATURE: 97 F | DIASTOLIC BLOOD PRESSURE: 94 MMHG

## 2024-01-07 PROBLEM — N93.9 ABNORMAL UTERINE AND VAGINAL BLEEDING, UNSPECIFIED: Chronic | Status: ACTIVE | Noted: 2021-10-28

## 2024-01-07 PROBLEM — D25.9 LEIOMYOMA OF UTERUS, UNSPECIFIED: Chronic | Status: ACTIVE | Noted: 2021-10-28

## 2024-01-07 LAB
ALBUMIN SERPL ELPH-MCNC: 4.7 G/DL — SIGNIFICANT CHANGE UP (ref 3.3–5)
ALBUMIN SERPL ELPH-MCNC: 4.7 G/DL — SIGNIFICANT CHANGE UP (ref 3.3–5)
ALP SERPL-CCNC: 49 U/L — SIGNIFICANT CHANGE UP (ref 40–120)
ALP SERPL-CCNC: 49 U/L — SIGNIFICANT CHANGE UP (ref 40–120)
ALT FLD-CCNC: 24 U/L — SIGNIFICANT CHANGE UP (ref 10–45)
ALT FLD-CCNC: 24 U/L — SIGNIFICANT CHANGE UP (ref 10–45)
ANION GAP SERPL CALC-SCNC: 12 MMOL/L — SIGNIFICANT CHANGE UP (ref 5–17)
ANION GAP SERPL CALC-SCNC: 12 MMOL/L — SIGNIFICANT CHANGE UP (ref 5–17)
APPEARANCE UR: CLEAR — SIGNIFICANT CHANGE UP
APPEARANCE UR: CLEAR — SIGNIFICANT CHANGE UP
AST SERPL-CCNC: 21 U/L — SIGNIFICANT CHANGE UP (ref 10–40)
AST SERPL-CCNC: 21 U/L — SIGNIFICANT CHANGE UP (ref 10–40)
BACTERIA # UR AUTO: NEGATIVE /HPF — SIGNIFICANT CHANGE UP
BACTERIA # UR AUTO: NEGATIVE /HPF — SIGNIFICANT CHANGE UP
BASOPHILS # BLD AUTO: 0.01 K/UL — SIGNIFICANT CHANGE UP (ref 0–0.2)
BASOPHILS # BLD AUTO: 0.01 K/UL — SIGNIFICANT CHANGE UP (ref 0–0.2)
BASOPHILS NFR BLD AUTO: 0.2 % — SIGNIFICANT CHANGE UP (ref 0–2)
BASOPHILS NFR BLD AUTO: 0.2 % — SIGNIFICANT CHANGE UP (ref 0–2)
BILIRUB SERPL-MCNC: 0.2 MG/DL — SIGNIFICANT CHANGE UP (ref 0.2–1.2)
BILIRUB SERPL-MCNC: 0.2 MG/DL — SIGNIFICANT CHANGE UP (ref 0.2–1.2)
BILIRUB UR-MCNC: NEGATIVE — SIGNIFICANT CHANGE UP
BILIRUB UR-MCNC: NEGATIVE — SIGNIFICANT CHANGE UP
BUN SERPL-MCNC: 6 MG/DL — LOW (ref 7–23)
BUN SERPL-MCNC: 6 MG/DL — LOW (ref 7–23)
CALCIUM SERPL-MCNC: 9.5 MG/DL — SIGNIFICANT CHANGE UP (ref 8.4–10.5)
CALCIUM SERPL-MCNC: 9.5 MG/DL — SIGNIFICANT CHANGE UP (ref 8.4–10.5)
CAST: 0 /LPF — SIGNIFICANT CHANGE UP (ref 0–4)
CAST: 0 /LPF — SIGNIFICANT CHANGE UP (ref 0–4)
CHLORIDE SERPL-SCNC: 100 MMOL/L — SIGNIFICANT CHANGE UP (ref 96–108)
CHLORIDE SERPL-SCNC: 100 MMOL/L — SIGNIFICANT CHANGE UP (ref 96–108)
CO2 SERPL-SCNC: 23 MMOL/L — SIGNIFICANT CHANGE UP (ref 22–31)
CO2 SERPL-SCNC: 23 MMOL/L — SIGNIFICANT CHANGE UP (ref 22–31)
COLOR SPEC: YELLOW — SIGNIFICANT CHANGE UP
COLOR SPEC: YELLOW — SIGNIFICANT CHANGE UP
CREAT SERPL-MCNC: 0.4 MG/DL — LOW (ref 0.5–1.3)
CREAT SERPL-MCNC: 0.4 MG/DL — LOW (ref 0.5–1.3)
DIFF PNL FLD: NEGATIVE — SIGNIFICANT CHANGE UP
DIFF PNL FLD: NEGATIVE — SIGNIFICANT CHANGE UP
EGFR: 123 ML/MIN/1.73M2 — SIGNIFICANT CHANGE UP
EGFR: 123 ML/MIN/1.73M2 — SIGNIFICANT CHANGE UP
EOSINOPHIL # BLD AUTO: 0.01 K/UL — SIGNIFICANT CHANGE UP (ref 0–0.5)
EOSINOPHIL # BLD AUTO: 0.01 K/UL — SIGNIFICANT CHANGE UP (ref 0–0.5)
EOSINOPHIL NFR BLD AUTO: 0.2 % — SIGNIFICANT CHANGE UP (ref 0–6)
EOSINOPHIL NFR BLD AUTO: 0.2 % — SIGNIFICANT CHANGE UP (ref 0–6)
GLUCOSE SERPL-MCNC: 106 MG/DL — HIGH (ref 70–99)
GLUCOSE SERPL-MCNC: 106 MG/DL — HIGH (ref 70–99)
GLUCOSE UR QL: NEGATIVE MG/DL — SIGNIFICANT CHANGE UP
GLUCOSE UR QL: NEGATIVE MG/DL — SIGNIFICANT CHANGE UP
HCG SERPL-ACNC: <2 MIU/ML — SIGNIFICANT CHANGE UP
HCG SERPL-ACNC: <2 MIU/ML — SIGNIFICANT CHANGE UP
HCT VFR BLD CALC: 42.4 % — SIGNIFICANT CHANGE UP (ref 34.5–45)
HCT VFR BLD CALC: 42.4 % — SIGNIFICANT CHANGE UP (ref 34.5–45)
HGB BLD-MCNC: 13.5 G/DL — SIGNIFICANT CHANGE UP (ref 11.5–15.5)
HGB BLD-MCNC: 13.5 G/DL — SIGNIFICANT CHANGE UP (ref 11.5–15.5)
IMM GRANULOCYTES NFR BLD AUTO: 0.3 % — SIGNIFICANT CHANGE UP (ref 0–0.9)
IMM GRANULOCYTES NFR BLD AUTO: 0.3 % — SIGNIFICANT CHANGE UP (ref 0–0.9)
KETONES UR-MCNC: ABNORMAL MG/DL
KETONES UR-MCNC: ABNORMAL MG/DL
LEUKOCYTE ESTERASE UR-ACNC: NEGATIVE — SIGNIFICANT CHANGE UP
LEUKOCYTE ESTERASE UR-ACNC: NEGATIVE — SIGNIFICANT CHANGE UP
LIDOCAIN IGE QN: 20 U/L — SIGNIFICANT CHANGE UP (ref 7–60)
LIDOCAIN IGE QN: 20 U/L — SIGNIFICANT CHANGE UP (ref 7–60)
LYMPHOCYTES # BLD AUTO: 0.85 K/UL — LOW (ref 1–3.3)
LYMPHOCYTES # BLD AUTO: 0.85 K/UL — LOW (ref 1–3.3)
LYMPHOCYTES # BLD AUTO: 13.1 % — SIGNIFICANT CHANGE UP (ref 13–44)
LYMPHOCYTES # BLD AUTO: 13.1 % — SIGNIFICANT CHANGE UP (ref 13–44)
MCHC RBC-ENTMCNC: 24 PG — LOW (ref 27–34)
MCHC RBC-ENTMCNC: 24 PG — LOW (ref 27–34)
MCHC RBC-ENTMCNC: 31.8 GM/DL — LOW (ref 32–36)
MCHC RBC-ENTMCNC: 31.8 GM/DL — LOW (ref 32–36)
MCV RBC AUTO: 75.4 FL — LOW (ref 80–100)
MCV RBC AUTO: 75.4 FL — LOW (ref 80–100)
MONOCYTES # BLD AUTO: 0.52 K/UL — SIGNIFICANT CHANGE UP (ref 0–0.9)
MONOCYTES # BLD AUTO: 0.52 K/UL — SIGNIFICANT CHANGE UP (ref 0–0.9)
MONOCYTES NFR BLD AUTO: 8 % — SIGNIFICANT CHANGE UP (ref 2–14)
MONOCYTES NFR BLD AUTO: 8 % — SIGNIFICANT CHANGE UP (ref 2–14)
NEUTROPHILS # BLD AUTO: 5.07 K/UL — SIGNIFICANT CHANGE UP (ref 1.8–7.4)
NEUTROPHILS # BLD AUTO: 5.07 K/UL — SIGNIFICANT CHANGE UP (ref 1.8–7.4)
NEUTROPHILS NFR BLD AUTO: 78.2 % — HIGH (ref 43–77)
NEUTROPHILS NFR BLD AUTO: 78.2 % — HIGH (ref 43–77)
NITRITE UR-MCNC: NEGATIVE — SIGNIFICANT CHANGE UP
NITRITE UR-MCNC: NEGATIVE — SIGNIFICANT CHANGE UP
NRBC # BLD: 0 /100 WBCS — SIGNIFICANT CHANGE UP (ref 0–0)
NRBC # BLD: 0 /100 WBCS — SIGNIFICANT CHANGE UP (ref 0–0)
PH UR: 7.5 — SIGNIFICANT CHANGE UP (ref 5–8)
PH UR: 7.5 — SIGNIFICANT CHANGE UP (ref 5–8)
PLATELET # BLD AUTO: 341 K/UL — SIGNIFICANT CHANGE UP (ref 150–400)
PLATELET # BLD AUTO: 341 K/UL — SIGNIFICANT CHANGE UP (ref 150–400)
POTASSIUM SERPL-MCNC: 3.6 MMOL/L — SIGNIFICANT CHANGE UP (ref 3.5–5.3)
POTASSIUM SERPL-MCNC: 3.6 MMOL/L — SIGNIFICANT CHANGE UP (ref 3.5–5.3)
POTASSIUM SERPL-SCNC: 3.6 MMOL/L — SIGNIFICANT CHANGE UP (ref 3.5–5.3)
POTASSIUM SERPL-SCNC: 3.6 MMOL/L — SIGNIFICANT CHANGE UP (ref 3.5–5.3)
PROT SERPL-MCNC: 8.6 G/DL — HIGH (ref 6–8.3)
PROT SERPL-MCNC: 8.6 G/DL — HIGH (ref 6–8.3)
PROT UR-MCNC: NEGATIVE MG/DL — SIGNIFICANT CHANGE UP
PROT UR-MCNC: NEGATIVE MG/DL — SIGNIFICANT CHANGE UP
RBC # BLD: 5.62 M/UL — HIGH (ref 3.8–5.2)
RBC # BLD: 5.62 M/UL — HIGH (ref 3.8–5.2)
RBC # FLD: 14.9 % — HIGH (ref 10.3–14.5)
RBC # FLD: 14.9 % — HIGH (ref 10.3–14.5)
RBC CASTS # UR COMP ASSIST: 0 /HPF — SIGNIFICANT CHANGE UP (ref 0–4)
RBC CASTS # UR COMP ASSIST: 0 /HPF — SIGNIFICANT CHANGE UP (ref 0–4)
SODIUM SERPL-SCNC: 135 MMOL/L — SIGNIFICANT CHANGE UP (ref 135–145)
SODIUM SERPL-SCNC: 135 MMOL/L — SIGNIFICANT CHANGE UP (ref 135–145)
SP GR SPEC: >1.03 — HIGH (ref 1–1.03)
SP GR SPEC: >1.03 — HIGH (ref 1–1.03)
SQUAMOUS # UR AUTO: 1 /HPF — SIGNIFICANT CHANGE UP (ref 0–5)
SQUAMOUS # UR AUTO: 1 /HPF — SIGNIFICANT CHANGE UP (ref 0–5)
UROBILINOGEN FLD QL: 0.2 MG/DL — SIGNIFICANT CHANGE UP (ref 0.2–1)
UROBILINOGEN FLD QL: 0.2 MG/DL — SIGNIFICANT CHANGE UP (ref 0.2–1)
WBC # BLD: 6.48 K/UL — SIGNIFICANT CHANGE UP (ref 3.8–10.5)
WBC # BLD: 6.48 K/UL — SIGNIFICANT CHANGE UP (ref 3.8–10.5)
WBC # FLD AUTO: 6.48 K/UL — SIGNIFICANT CHANGE UP (ref 3.8–10.5)
WBC # FLD AUTO: 6.48 K/UL — SIGNIFICANT CHANGE UP (ref 3.8–10.5)
WBC UR QL: 0 /HPF — SIGNIFICANT CHANGE UP (ref 0–5)
WBC UR QL: 0 /HPF — SIGNIFICANT CHANGE UP (ref 0–5)

## 2024-01-07 PROCEDURE — 84702 CHORIONIC GONADOTROPIN TEST: CPT

## 2024-01-07 PROCEDURE — 99283 EMERGENCY DEPT VISIT LOW MDM: CPT

## 2024-01-07 PROCEDURE — 83690 ASSAY OF LIPASE: CPT

## 2024-01-07 PROCEDURE — 74177 CT ABD & PELVIS W/CONTRAST: CPT | Mod: MA

## 2024-01-07 PROCEDURE — 96374 THER/PROPH/DIAG INJ IV PUSH: CPT | Mod: XU

## 2024-01-07 PROCEDURE — 99284 EMERGENCY DEPT VISIT MOD MDM: CPT | Mod: 25

## 2024-01-07 PROCEDURE — 81001 URINALYSIS AUTO W/SCOPE: CPT

## 2024-01-07 PROCEDURE — 76830 TRANSVAGINAL US NON-OB: CPT | Mod: 26

## 2024-01-07 PROCEDURE — 85025 COMPLETE CBC W/AUTO DIFF WBC: CPT

## 2024-01-07 PROCEDURE — 76830 TRANSVAGINAL US NON-OB: CPT

## 2024-01-07 PROCEDURE — 74177 CT ABD & PELVIS W/CONTRAST: CPT | Mod: 26,MA

## 2024-01-07 PROCEDURE — 99285 EMERGENCY DEPT VISIT HI MDM: CPT

## 2024-01-07 PROCEDURE — 96376 TX/PRO/DX INJ SAME DRUG ADON: CPT

## 2024-01-07 PROCEDURE — 80053 COMPREHEN METABOLIC PANEL: CPT

## 2024-01-07 PROCEDURE — 87086 URINE CULTURE/COLONY COUNT: CPT

## 2024-01-07 RX ORDER — KETOROLAC TROMETHAMINE 30 MG/ML
15 SYRINGE (ML) INJECTION ONCE
Refills: 0 | Status: DISCONTINUED | OUTPATIENT
Start: 2024-01-07 | End: 2024-01-07

## 2024-01-07 RX ADMIN — Medication 15 MILLIGRAM(S): at 20:54

## 2024-01-07 RX ADMIN — Medication 15 MILLIGRAM(S): at 15:01

## 2024-01-07 NOTE — ED PROVIDER NOTE - OBJECTIVE STATEMENT
47 y/o female, hx of hysterectomy, fibroids, HTN, presents to the ER for abdominal pain. states pain has been intermittent for months. states pain restarted this morning. states located to RLQ. states at times pain radiates down right leg. states does not have pain at this time. f/n/v/d, CP, SOB, HA, dizziness, urinary symptoms, numbness, tingling, cough. 49 y/o female, hx of hysterectomy, fibroids, HTN, presents to the ER for abdominal pain. states pain has been intermittent for months. states pain restarted this morning. states located to RLQ. states at times pain radiates down right leg. states does not have pain at this time. f/n/v/d, CP, SOB, HA, dizziness, urinary symptoms, numbness, tingling, cough.

## 2024-01-07 NOTE — ED PROVIDER NOTE - CLINICAL SUMMARY MEDICAL DECISION MAKING FREE TEXT BOX
AIMEE Juarez MD: 48F with PMH hysterectomy, uterine fibroids, HTN, p/w c/o RLQ AP that is worse with ambulation, sometimes radiating down R anterior leg. Denies trauma, urinary complaints, n/v/d, f/c, CP/SOB. Plan: basic labs, CTAP, u/a, ?TVUS, pain control, reassess

## 2024-01-07 NOTE — ED ADULT NURSE NOTE - OBJECTIVE STATEMENT
48 y.o F BIB self p/w c/o abdominal pain. A+Ox4. Pt states for past couple of years has been having R groin pain that radiates down mid R thigh and stops at R knee. Reports usually when pain comes on it goes away in a couple minutes, but when this episode started last night its resided, and worsens w/ walking. States pain is rated 3/10 on pain scale currently, but when worsens stats 10/10 pain. Reports worsening pain w/ walking. Described pain as cramping. PMH hysterectomy x2 yrs ago for fibroids, HTN on amlodipine and valsartan. Took 2 Tylenol yesterday for pain, states doesn't remember if relief or not. No other complaints at this time, father at bedside, safety maintained.

## 2024-01-07 NOTE — ED PROVIDER NOTE - NSFOLLOWUPINSTRUCTIONS_ED_ALL_ED_FT
You were seen in the Emergency Department for abdominal pain. Lab and imaging results, if performed, were discussed with you along with your discharge diagnosis.    Please follow with your Gynecologist. If you do not have one you may follow with the provider below:       Dr. Tristan Thakur     [address] [facility] [place] [address]     [phone]    It was recommended that you have a repeat ultrasound in 6 months.       Follow up with your doctor in 1 week - bring copies of your results if you were given. If you do not have a primary doctor, please call [phone] to find one convenient for you.    Continue all prescribed medications.     To control your pain at home, you should take Ibuprofen 400 mg along with Tylenol 650mg-1000mg every 6 to 8 hours. Limit your maximum daily Tylenol from all sources to 4000mg. Be aware that many other medications contain acetaminophen which is also known as Tylenol. Taking Tylenol and Ibuprofen together has been shown to be more effective at relieving pain than taking them separately. These are both over the counter medications that you can  at your local pharmacy without a prescription. You need to respect all of the warnings on the bottles. You shouldn’t take these medications for more than a week without following up with your doctor. Both medications come with certain risks and side effects that you need to discuss with your doctor, especially if you are taking them for a prolonged period.    Return to ED for any new or worsening symptoms including but not limited to: development of worsening pain,  chest pain, shortness of breath, fever, vomiting, focal numbness, weakness or tingling, any severe CP, headache, abdominal pain, back pain.      Rest and keep yourself hydrated with fluids. You were seen in the Emergency Department for abdominal pain. Lab and imaging results, if performed, were discussed with you along with your discharge diagnosis.    Please follow with your Gynecologist. If you do not have one you may follow with the provider below:       Dr. Tristan Thakur     [address] [facility] [place] [address]     [phone]    It was recommended that you have a repeat ultrasound in 6 months.       Follow up with your doctor in 1 week - bring copies of your results if you were given. If you do not have a primary doctor, please call [phone] to find one convenient for you.    Continue all prescribed medications.     To control your pain at home, you should take Ibuprofen 400 mg along with Tylenol 650mg-1000mg every 6 to 8 hours. Limit your maximum daily Tylenol from all sources to 4000mg. Be aware that many other medications contain acetaminophen which is also known as Tylenol. Taking Tylenol and Ibuprofen together has been shown to be more effective at relieving pain than taking them separately. These are both over the counter medications that you can  at your local pharmacy without a prescription. You need to respect all of the warnings on the bottles. You shouldn’t take these medications for more than a week without following up with your doctor. Both medications come with certain risks and side effects that you need to discuss with your doctor, especially if you are taking them for a prolonged period.    Return to ED for any new or worsening symptoms including but not limited to: development of worsening pain,  chest pain, shortness of breath, fever, vomiting, focal numbness, weakness or tingling, any severe CP, headache, abdominal pain, back pain.      Rest and keep yourself hydrated with fluids. You were seen in the Emergency Department for abdominal pain. Lab and imaging results, if performed, were discussed with you along with your discharge diagnosis.    Please follow with your Gynecologist. If you do not have one you may follow with the provider below:       Dr. Tristan Thakur     [address] [facility] [place] [address]     [phone]    Please follow with Dr. Shepherd, spine specialist.     It was recommended that you have a repeat ultrasound in 6 months.       Follow up with your doctor in 1 week - bring copies of your results if you were given. If you do not have a primary doctor, please call [phone] to find one convenient for you.    Continue all prescribed medications.     To control your pain at home, you should take Ibuprofen 400 mg along with Tylenol 650mg-1000mg every 6 to 8 hours. Limit your maximum daily Tylenol from all sources to 4000mg. Be aware that many other medications contain acetaminophen which is also known as Tylenol. Taking Tylenol and Ibuprofen together has been shown to be more effective at relieving pain than taking them separately. These are both over the counter medications that you can  at your local pharmacy without a prescription. You need to respect all of the warnings on the bottles. You shouldn’t take these medications for more than a week without following up with your doctor. Both medications come with certain risks and side effects that you need to discuss with your doctor, especially if you are taking them for a prolonged period.    Return to ED for any new or worsening symptoms including but not limited to: development of worsening pain,  chest pain, shortness of breath, fever, vomiting, focal numbness, weakness or tingling, any severe CP, headache, abdominal pain, back pain.      Rest and keep yourself hydrated with fluids. You were seen in the Emergency Department for abdominal pain. Lab and imaging results, if performed, were discussed with you along with your discharge diagnosis.    Please follow with your Gynecologist. If you do not have one you may follow with the provider below:       Dr. Tristan Thakur     [address] [facility] [place] [address]     [phone]    Please follow with Dr. Shepherd, spine specialist.     It was recommended that you have a repeat ultrasound in 6 months.       Follow up with your doctor in 1 week - bring copies of your results if you were given. If you do not have a primary doctor, please call [phone] to find one convenient for you.    Continue all prescribed medications.     To control your pain at home, you should take Ibuprofen 400 mg along with Tylenol 650mg-1000mg every 6 to 8 hours. Limit your maximum daily Tylenol from all sources to 4000mg. Be aware that many other medications contain acetaminophen which is also known as Tylenol. Taking Tylenol and Ibuprofen together has been shown to be more effective at relieving pain than taking them separately. These are both over the counter medications that you can  at your local pharmacy without a prescription. You need to respect all of the warnings on the bottles. You shouldn’t take these medications for more than a week without following up with your doctor. Both medications come with certain risks and side effects that you need to discuss with your doctor, especially if you are taking them for a prolonged period.    Return to ED for any new or worsening symptoms including but not limited to: development of worsening pain,  chest pain, shortness of breath, fever, vomiting, focal numbness, weakness or tingling, any severe CP, headache, abdominal pain, back pain.      Rest and keep yourself hydrated with fluids.

## 2024-01-07 NOTE — CONSULT NOTE ADULT - SUBJECTIVE AND OBJECTIVE BOX
GYN Consult Note    48y  s/p TLH (two years ago) presents with acute on chronic RLQ pain radiating to her R leg.  Gyn consulted for rule out ovarian torsion.  Patient states that she has had intermittent RLQ pain approximately once a month for several months, but this AM woke up with severe 10/10 cramping/sharp RLQ pain radiating down her R leg.  Endorses mild nausea today without vomiting, and one episode of "hot flashes" today without fevers/chills.  States pain has now resolved since being in the ED.  She received Toradol in the ED approximately 5 hours ago, and has not had pain medication since that time.  Also improved with bowel movement in the ED.    Denies lightheadedness, dizziness, chest pain, shortness of breath, fevers, chills.  Denies urinary symptoms.    OB/GYN HISTORY:   Physician: Dr. Morin at [facility] (last saw her last year, reports Dr. Morin is now retired and she has not yet established care with another physician)  Ob:   - medical TOP x2 (many years ago)  - FT  22 yrs ago  Gyn: +hx of fibroids - s/p TLH ?BS (states she still has both ovaries) - 2 years ago.  Reports she took Lupron prior to TLH; Denies history of ovarian cysts, endometriosis, abnormal pap smears, STIs  - Patient has not been sexually active in 2 years  PMH: HTN  PSH: TLH (2 years ago), kidney biopsy - benign (age 15)  Meds:  Valsartan 160mg QD, Amlodipine 10mg QD  Allergies: NKDA        Vital Signs Last 24 Hrs  T(C): 36.3 (2024 17:35), Max: 36.7 (2024 09:45)  T(F): 97.4 (2024 17:35), Max: 98.1 (2024 13:19)  HR: 69 (2024 17:35) (69 - 89)  BP: 141/86 (2024 17:35) (139/85 - 165/94)  BP(mean): 115 (2024 09:45) (115 - 115)  RR: 18 (2024 17:35) (18 - 22)  SpO2: 98% (2024 17:35) (98% - 100%)    Parameters below as of 2024 17:35  Patient On (Oxygen Delivery Method): room air      PHYSICAL EXAM:   Gen: NAD, alert and oriented x 3  Cardiovascular: RRR  Respiratory: breathing comfortably on RA  Abd: soft, non tender, non-distended, no rebound, no guarding  Pelvic: vaginal cuff intact, white discharge noted, uterus absent, no adnexal masses or tenderness,   Extremities: non-tender b/l, no edema   Skin: warm and well perfused  *Pelvic exam performed with chaperone present: DAVID Blum    LABS:                        13.5   6.48  )-----------( 341      ( 2024 10:10 )             42.4         135  |  100  |  6<L>  ----------------------------<  106<H>  3.6   |  23  |  0.40<L>    Ca    9.5      2024 10:10    TPro  8.6<H>  /  Alb  4.7  /  TBili  0.2  /  DBili  x   /  AST  21  /  ALT  24  /  AlkPhos  49        Urinalysis Basic - ( 2024 13:17 )    Color: Yellow / Appearance: Clear / SG: >1.030 / pH: x  Gluc: x / Ketone: Trace mg/dL  / Bili: Negative / Urobili: 0.2 mg/dL   Blood: x / Protein: Negative mg/dL / Nitrite: Negative   Leuk Esterase: Negative / RBC: 0 /HPF / WBC 0 /HPF   Sq Epi: x / Non Sq Epi: 1 /HPF / Bacteria: Negative /HPF    HCG: <2    RADIOLOGY & ADDITIONAL STUDIES:    ACC: [id number] EXAM:  CT ABDOMEN AND PELVIS IC   ORDERED BY: AYDIN MENCHACA     PROCEDURE DATE:  2024          INTERPRETATION:  CLINICAL INFORMATION: 48-year-old with abdominal pain.    COMPARISON: None.    CONTRAST/COMPLICATIONS:  IV Contrast: Omnipaque 350  90 cc administered   10 cc discarded  Oral Contrast: NONE  Complications: None reported at time of study completion    PROCEDURE:  CT of the Abdomen and Pelvis was performed.  Sagittal and coronal reformats were performed.    FINDINGS:  LOWER CHEST: Within normal limits.    LIVER: Scattered hepatic cysts.  BILE DUCTS: Normal caliber.  GALLBLADDER: Within normal limits.  SPLEEN: Within normal limits.  PANCREAS: Within normal limits.  ADRENALS: Within normal limits.  KIDNEYS/URETERS: Scattered small renal cysts.    BLADDER: Within normal limits.  REPRODUCTIVE ORGANS: Hysterectomy. Right adnexal cyst measuring 3.0 cm.    BOWEL: Normal appearance of the appendix.  PERITONEUM: No ascites.  VESSELS: Within normal limits.  RETROPERITONEUM/LYMPH NODES: No lymphadenopathy.  ABDOMINAL WALL: Within normal limits.  BONES: Within normal limits.            IMPRESSION:    No acute abdominal/pelvic pathology.    --- End of Report ---          KATE DELACRUZ MD; Resident Radiologist  This document has been electronically signed.  DORON TRAYLOR MD; Attending Radiologist  This document has been electronically signed. 2024 12:35PM    ACC: [id number] EXAM:  US TRANSVAGINAL   ORDERED BY: AYSHA SHANNON     PROCEDURE DATE:  2024          INTERPRETATION:  CLINICAL INFORMATION: Right lower quadrant pain.   Evaluate for ovarian pathology. History of hysterectomy.    COMPARISON: CT abdomen and pelvis 2024    TECHNIQUE:  Endovaginal pelvic sonogram as per order. Transabdominal pelvic sonogram   was also performed as part of our standard protocol.    FINDINGS:  Uterus and endometrium: Hysterectomy.    Right ovary: Not visualized.  Left ovary: 3.4 x 2.9 x 2.8 cm. Simple left ovarian cyst measuring 2.9   cm, consistent with cyst seen on CT. Color Doppler flow is demonstrated   in the left ovary, however spectral Doppler is limited on this study.    Fluid: None.    IMPRESSION:  Right ovary not visualized.    Simple left ovarian cyst measuring 2.9 cm, consistent with cyst seen on   CT.    --- End of Report ---           KENDALL TAYLOR MD; Resident Radiologist  This document has been electronically signed.  ANYI ROSSI DO; Attending Radiologist  This document has been electronically signed. 2024  4:59PM GYN Consult Note    48y  s/p TLH (two years ago) presents with acute on chronic RLQ pain radiating to her R leg.  Gyn consulted for rule out ovarian torsion.  Patient states that she has had intermittent RLQ pain approximately once a month for several months, but this AM woke up with severe 10/10 cramping/sharp RLQ pain radiating down her R leg.  Endorses mild nausea today without vomiting, and one episode of "hot flashes" today without fevers/chills.  States pain has now resolved since being in the ED.  She received Toradol in the ED approximately 5 hours ago, and has not had pain medication since that time.  Also improved with bowel movement in the ED.    Denies lightheadedness, dizziness, chest pain, shortness of breath, fevers, chills.  Denies urinary symptoms.    OB/GYN HISTORY:   Physician: Dr. Morin at [facility] (last saw her last year, reports Dr. Morin is now retired and she has not yet established care with another physician)  Ob:   - medical TOP x2 (many years ago)  - FT  22 yrs ago  Gyn: +hx of fibroids - s/p TLH ?BS (states she still has both ovaries) - 2 years ago.  Reports she took Lupron prior to TLH; Denies history of ovarian cysts, endometriosis, abnormal pap smears, STIs  - Patient has not been sexually active in 2 years  PMH: HTN  PSH: TLH (2 years ago), kidney biopsy - benign (age 15)  Meds:  Valsartan 160mg QD, Amlodipine 10mg QD  Allergies: NKDA        Vital Signs Last 24 Hrs  T(C): 36.3 (2024 17:35), Max: 36.7 (2024 09:45)  T(F): 97.4 (2024 17:35), Max: 98.1 (2024 13:19)  HR: 69 (2024 17:35) (69 - 89)  BP: 141/86 (2024 17:35) (139/85 - 165/94)  BP(mean): 115 (2024 09:45) (115 - 115)  RR: 18 (2024 17:35) (18 - 22)  SpO2: 98% (2024 17:35) (98% - 100%)    Parameters below as of 2024 17:35  Patient On (Oxygen Delivery Method): room air      PHYSICAL EXAM:   Gen: NAD, alert and oriented x 3  Cardiovascular: RRR  Respiratory: breathing comfortably on RA  Abd: soft, non tender, non-distended, no rebound, no guarding  Pelvic: vaginal cuff intact, white discharge noted, uterus absent, no adnexal masses or tenderness,   Extremities: non-tender b/l, no edema   Skin: warm and well perfused  *Pelvic exam performed with chaperone present: DAVID Blum    LABS:                        13.5   6.48  )-----------( 341      ( 2024 10:10 )             42.4         135  |  100  |  6<L>  ----------------------------<  106<H>  3.6   |  23  |  0.40<L>    Ca    9.5      2024 10:10    TPro  8.6<H>  /  Alb  4.7  /  TBili  0.2  /  DBili  x   /  AST  21  /  ALT  24  /  AlkPhos  49        Urinalysis Basic - ( 2024 13:17 )    Color: Yellow / Appearance: Clear / SG: >1.030 / pH: x  Gluc: x / Ketone: Trace mg/dL  / Bili: Negative / Urobili: 0.2 mg/dL   Blood: x / Protein: Negative mg/dL / Nitrite: Negative   Leuk Esterase: Negative / RBC: 0 /HPF / WBC 0 /HPF   Sq Epi: x / Non Sq Epi: 1 /HPF / Bacteria: Negative /HPF    HCG: <2    RADIOLOGY & ADDITIONAL STUDIES:    ACC: [id number] EXAM:  CT ABDOMEN AND PELVIS IC   ORDERED BY: AYDIN MENCHACA     PROCEDURE DATE:  2024          INTERPRETATION:  CLINICAL INFORMATION: 48-year-old with abdominal pain.    COMPARISON: None.    CONTRAST/COMPLICATIONS:  IV Contrast: Omnipaque 350  90 cc administered   10 cc discarded  Oral Contrast: NONE  Complications: None reported at time of study completion    PROCEDURE:  CT of the Abdomen and Pelvis was performed.  Sagittal and coronal reformats were performed.    FINDINGS:  LOWER CHEST: Within normal limits.    LIVER: Scattered hepatic cysts.  BILE DUCTS: Normal caliber.  GALLBLADDER: Within normal limits.  SPLEEN: Within normal limits.  PANCREAS: Within normal limits.  ADRENALS: Within normal limits.  KIDNEYS/URETERS: Scattered small renal cysts.    BLADDER: Within normal limits.  REPRODUCTIVE ORGANS: Hysterectomy. Right adnexal cyst measuring 3.0 cm.    BOWEL: Normal appearance of the appendix.  PERITONEUM: No ascites.  VESSELS: Within normal limits.  RETROPERITONEUM/LYMPH NODES: No lymphadenopathy.  ABDOMINAL WALL: Within normal limits.  BONES: Within normal limits.            IMPRESSION:    No acute abdominal/pelvic pathology.    --- End of Report ---          KATE DELACRUZ MD; Resident Radiologist  This document has been electronically signed.  DORON TRAYLOR MD; Attending Radiologist  This document has been electronically signed. 2024 12:35PM    ACC: [id number] EXAM:  US TRANSVAGINAL   ORDERED BY: AYSHA SHANNON     PROCEDURE DATE:  2024          INTERPRETATION:  CLINICAL INFORMATION: Right lower quadrant pain.   Evaluate for ovarian pathology. History of hysterectomy.    COMPARISON: CT abdomen and pelvis 2024    TECHNIQUE:  Endovaginal pelvic sonogram as per order. Transabdominal pelvic sonogram   was also performed as part of our standard protocol.    FINDINGS:  Uterus and endometrium: Hysterectomy.    Right ovary: Not visualized.  Left ovary: 3.4 x 2.9 x 2.8 cm. Simple left ovarian cyst measuring 2.9   cm, consistent with cyst seen on CT. Color Doppler flow is demonstrated   in the left ovary, however spectral Doppler is limited on this study.    Fluid: None.    IMPRESSION:  Right ovary not visualized.    Simple left ovarian cyst measuring 2.9 cm, consistent with cyst seen on   CT.    --- End of Report ---           KENDALL TAYLOR MD; Resident Radiologist  This document has been electronically signed.  ANYI ROSSI DO; Attending Radiologist  This document has been electronically signed. 2024  4:59PM

## 2024-01-07 NOTE — CONSULT NOTE ADULT - ATTENDING COMMENTS
I have personally seen, examined, and participated in the care of this patient. I have reviewed all pertinent clinical information, including history, physical exam, plan, and the Resident 's note and agree except as noted.    Tristan Thakur MD

## 2024-01-07 NOTE — ED ADULT NURSE NOTE - NSFALLUNIVINTERV_ED_ALL_ED
Bed/Stretcher in lowest position, wheels locked, appropriate side rails in place/Call bell, personal items and telephone in reach/Instruct patient to call for assistance before getting out of bed/chair/stretcher/Non-slip footwear applied when patient is off stretcher/[place] to call system/Physically safe environment - no spills, clutter or unnecessary equipment/Purposeful proactive rounding/Room/bathroom lighting operational, light cord in reach Bed/Stretcher in lowest position, wheels locked, appropriate side rails in place/Call bell, personal items and telephone in reach/Instruct patient to call for assistance before getting out of bed/chair/stretcher/Non-slip footwear applied when patient is off stretcher/[place] to call system/Physically safe environment - no spills, clutter or unnecessary equipment/Purposeful proactive rounding/Room/bathroom lighting operational, light cord in reach

## 2024-01-07 NOTE — ED PROVIDER NOTE - PATIENT PORTAL LINK FT
You can access the FollowMyHealth Patient Portal offered by [facility] by registering at the following website: [email or web address]. By joining [facility]’s FollowMyHealth portal, you will also be able to view your health information using other applications (apps) compatible with our system. You can access the FollowMyHealth Patient Portal offered by [facility] by registering at the following website: [email or web address]. By joining [facility]’s FollowMyHealth portal, you will also be able to view your health information using other applications (apps) compatible with our system.

## 2024-01-07 NOTE — ED PROVIDER NOTE - CARE PROVIDER_API CALL
Eddie Shepherd)  Orthopaedic Surgery  [address], [address]  [place] [address]  Phone: [phone]  Fax: [phone]  Follow Up Time:    Eddie Shepherd)  Orthopaedic Surgery  [address], [address]  [place] [address]  Phone: [phone]  Fax: [phone]  Follow Up Time:

## 2024-01-07 NOTE — ED PROVIDER NOTE - CARE PROVIDERS DIRECT ADDRESSES
[email or web address]

## 2024-01-07 NOTE — ED PROVIDER NOTE - PROGRESS NOTE DETAILS
Pratibha Ny PA-C: results reviewed and discussed with patient. patients pain is right sided and unable to visualize ovary on US. obgyn consulted. awaiting call back.

## 2024-01-07 NOTE — CONSULT NOTE ADULT - ASSESSMENT
48y  s/p TLH (two years ago) presents with acute on chronic RLQ pain radiating to her R leg since this morning, now resolved.  Patient well appearing on exam with no abdominal or adnexal tenderness or masses.  VSS.  CBC wnl without leukocytosis.  CT showing small R adnexal mass.  TVUS showing small L ovarian cyst and R adnexa not visualized, no pelvic free fluid.  Patient is currently hemodynamically and clinically stable without evidence of torsion on exam.  Differential includes intermittent torsion vs already torsed and avascular ovary vs non-gynecologic etiology of patient's pain.    Recommendations:  - Lengthy discussion with patient regarding inability to obtain definitive diagnosis of ovarian torsion without diagnostic laparoscopy.  Discussed possibility of intermittent torsion vs previously torsed and avascular ovary.  Risk benefit discussion held with patient regarding surgery in the setting of no current pain and no desire for future fertility and postmenopausal state.  Patient expressed understanding of the above.     - Final recommendations pending evaluation by attending physician    INCOMPLETE NOTE    To be discussed with attending physician  VEGA Arreguin PGY2 48y  s/p TLH (two years ago) presents with acute on chronic RLQ pain radiating to her R leg since this morning, now resolved.  Patient well appearing on exam with no abdominal or adnexal tenderness or masses.  VSS.  CBC wnl without leukocytosis.  CT showing small R adnexal mass.  TVUS showing small L ovarian cyst and R adnexa not visualized, no pelvic free fluid.  Patient is currently hemodynamically and clinically stable without evidence of torsion on exam.  Differential includes intermittent torsion vs already torsed and avascular ovary vs non-gynecologic etiology of patient's pain.    Recommendations:  - Lengthy discussion with patient regarding inability to obtain definitive diagnosis of ovarian torsion without diagnostic laparoscopy.  Discussed possibility of intermittent torsion vs previously torsed and avascular ovary.  Risk benefit discussion held with patient regarding surgery in the setting of no current pain and no desire for future fertility.  Patient expressed understanding of the above, does not desire surgery at this time.  - Recommend follow up ultrasound in 6 months to evaluate adnexal mass  - Recommend patient establish care with outpatient OBGYN, can provide patient the following information for follow up:       Dr. Tristan Thakur       [address] [place] [address] [phone]  - Remainder of care per ED    Discussed with Dr. Thakur, Service Attending  VEGA Arreguin PGY2 48y  s/p TLH (two years ago) presents with acute on chronic RLQ pain radiating to her R leg since this morning, now resolved.  Patient well appearing on exam with no abdominal or adnexal tenderness or masses.  VSS.  CBC wnl without leukocytosis.  CT showing small R adnexal mass.  TVUS showing small L ovarian cyst and R adnexa not visualized, no pelvic free fluid.  Patient is currently hemodynamically and clinically stable without evidence of torsion on exam.  Differential includes intermittent torsion vs already torsed and avascular ovary vs non-gynecologic etiology of patient's pain.    Recommendations:  - Lengthy discussion with patient regarding inability to obtain definitive diagnosis of ovarian torsion without diagnostic laparoscopy.  Discussed possibility of intermittent torsion vs previously torsed and avascular ovary.  Risk benefit discussion held with patient regarding surgery in the setting of no current pain and no desire for future fertility.  Patient expressed understanding of the above, does not desire surgery at this time.  - Recommend follow up ultrasound in 6 months to evaluate adnexal mass  - Recommend patient establish care with outpatient OBGYN, can provide patient the following information for follow up:       Dr. Tristan Thakur       [address] [place] [address] [phone]  - Remainder of care per ED    Discussed with Dr. Thakur, Service Attending  VEGA Arreguin PGY2 48y  s/p TLH (two years ago) presents with acute on chronic RLQ pain radiating to her R leg since this morning, now resolved.  Patient well appearing on exam with no abdominal or adnexal tenderness or masses.  VSS.  CBC wnl without leukocytosis.  CT showing small R adnexal mass.  TVUS showing small L ovarian cyst and R adnexa not visualized, no pelvic free fluid.  Patient is currently hemodynamically and clinically stable without evidence of torsion on exam.  Differential includes intermittent torsion vs already torsed and avascular ovary vs non-gynecologic etiology of patient's pain.    Recommendations:  - Lengthy discussion with patient regarding inability to obtain definitive diagnosis of ovarian torsion without diagnostic laparoscopy.  Discussed possibility of intermittent torsion vs previously torsed and avascular ovary.  Risk benefit discussion held with patient regarding surgery in the setting of no current pain and no desire for future fertility.  Patient expressed understanding of the above, does not desire surgery at this time.  - No acute gyn intervention indicated at this time  - Recommend follow up ultrasound in 6 months to evaluate adnexal mass  - Recommend patient establish care with outpatient OBGYN, can provide patient the following information for follow up:       Dr. Tristan Thakur       [address] [place] [address] [phone]  - Patient can return to ED if pain returns or worsens  - Remainder of care per ED    Discussed with Dr. Thakur, Service Attending  VEGA Arreguin PGY2 48y  s/p TLH (two years ago) presents with acute on chronic RLQ pain radiating to her R leg since this morning, now resolved.  Patient well appearing on exam with no abdominal or adnexal tenderness or masses.  VSS.  CBC wnl without leukocytosis.  CT showing small R adnexal mass.  TVUS showing small L ovarian cyst and R adnexa not visualized, no pelvic free fluid.  Patient is currently hemodynamically and clinically stable without evidence of torsion on exam.  Differential includes intermittent torsion vs already torsed and avascular ovary vs non-gynecologic etiology of patient's pain.    Recommendations:  - Lengthy discussion with patient regarding inability to obtain definitive diagnosis of ovarian torsion without diagnostic laparoscopy.  Discussed possibility of intermittent torsion vs previously torsed and avascular ovary.  Risk benefit discussion held with patient regarding surgery in the setting of no current pain and no desire for future fertility.  Patient expressed understanding of the above, does not desire surgery at this time.  - No acute gyn intervention indicated at this time  - Recommend follow up ultrasound in 6 months to evaluate adnexal mass  - Recommend patient establish care with outpatient OBGYN, can provide patient the following information for follow up:       Dr. Tristan Thakur       [address] [place] [address] [phone]  - Patient can return to ED if pain returns or worsens  - Remainder of care per ED    Discussed with Dr. Thakur, Service Attending  VEGA Arreguin PGY2

## 2024-01-07 NOTE — ED ADULT NURSE REASSESSMENT NOTE - NS ED NURSE REASSESS COMMENT FT1
Pt AxOx3, observed sitting up in stretcher conversing with RN without difficulty. Breathing spontaneous and unlabored. Pt updated on plan of care awaiting consult by OBGYN. No acute distress noted.  at bedside.

## 2024-01-07 NOTE — ED PROVIDER NOTE - DIFFERENTIAL DIAGNOSIS
Differential Diagnosis Ddx includes, however, is not limited to: appy, ovarian cyst, uti, msk pain, other

## 2024-01-08 LAB
CULTURE RESULTS: SIGNIFICANT CHANGE UP
CULTURE RESULTS: SIGNIFICANT CHANGE UP
SPECIMEN SOURCE: SIGNIFICANT CHANGE UP
SPECIMEN SOURCE: SIGNIFICANT CHANGE UP

## 2024-11-05 NOTE — ASU PREOP CHECKLIST - ORDERS/MEDICATION ADMINISTRATION RECORD ON CHART
Patient has requested a refill on the medication Hydrocodone with Tylenol. Patient is completely out of medication      done
